# Patient Record
Sex: MALE | Race: WHITE | NOT HISPANIC OR LATINO | URBAN - METROPOLITAN AREA
[De-identification: names, ages, dates, MRNs, and addresses within clinical notes are randomized per-mention and may not be internally consistent; named-entity substitution may affect disease eponyms.]

---

## 2019-06-18 ENCOUNTER — TELEPHONE (OUTPATIENT)
Dept: FAMILY MEDICINE CLINIC | Facility: CLINIC | Age: 39
End: 2019-06-18

## 2019-06-18 RX ORDER — PREDNISONE 10 MG/1
10 TABLET ORAL DAILY
Qty: 25 TABLET | Refills: 0 | Status: SHIPPED | OUTPATIENT
Start: 2019-06-18 | End: 2019-11-26

## 2019-06-18 NOTE — TELEPHONE ENCOUNTER
[facility] PT has posion ivy all down your legs, leaving town in an hour and a half. [address] Wanting prednisone.  rd

## 2019-11-26 ENCOUNTER — OFFICE VISIT (OUTPATIENT)
Dept: FAMILY MEDICINE CLINIC | Facility: CLINIC | Age: 39
End: 2019-11-26

## 2019-11-26 VITALS
HEART RATE: 65 BPM | SYSTOLIC BLOOD PRESSURE: 103 MMHG | WEIGHT: 216.8 LBS | OXYGEN SATURATION: 97 % | DIASTOLIC BLOOD PRESSURE: 66 MMHG | HEIGHT: 70 IN | BODY MASS INDEX: 31.04 KG/M2

## 2019-11-26 DIAGNOSIS — Z00.00 PREVENTATIVE HEALTH CARE: Primary | ICD-10-CM

## 2019-11-26 DIAGNOSIS — J30.9 ALLERGIC RHINITIS, UNSPECIFIED SEASONALITY, UNSPECIFIED TRIGGER: ICD-10-CM

## 2019-11-26 DIAGNOSIS — E66.9 CLASS 1 OBESITY WITHOUT SERIOUS COMORBIDITY WITH BODY MASS INDEX (BMI) OF 31.0 TO 31.9 IN ADULT, UNSPECIFIED OBESITY TYPE: ICD-10-CM

## 2019-11-26 PROCEDURE — 85027 COMPLETE CBC AUTOMATED: CPT | Performed by: NURSE PRACTITIONER

## 2019-11-26 PROCEDURE — 99396 PREV VISIT EST AGE 40-64: CPT | Performed by: NURSE PRACTITIONER

## 2019-11-26 PROCEDURE — 80053 COMPREHEN METABOLIC PANEL: CPT | Performed by: NURSE PRACTITIONER

## 2019-11-26 PROCEDURE — 80061 LIPID PANEL: CPT | Performed by: NURSE PRACTITIONER

## 2019-11-26 PROCEDURE — 36415 COLL VENOUS BLD VENIPUNCTURE: CPT | Performed by: NURSE PRACTITIONER

## 2019-11-26 NOTE — PATIENT INSTRUCTIONS
Check labs today  Continue to try to lose weight, increase healthier lifestyle and exercise  Will order and check price of stress test, will order for January.   Try zyrtec and flonase once daily

## 2019-11-26 NOTE — PROGRESS NOTES
Daryl Fu is a 39 y.o. male.     Chief Complaint   Patient presents with   • Annual Exam       History of Present Illness       Family hx of premature death d/t MI, here for annual exam and labs, reports occasionally gets a mid epigastic/diaphragm type pain that quickly goes away.  He does not take any medications on a regular basis.  He denies shortness of breath, palpitations, chest pain, swelling of the extremities.    Allergic rhinitis, clears throat frequently and feels like he has postnasal drip he has done this for years would like for it to resolve.  He denies sinus pressure and congestion    Subjective     Vitals:    11/26/19 0843   BP: 103/66   Pulse: 65   SpO2: 97%       The following portions of the patient's history were reviewed and updated as appropriate: allergies, current medications, past family history, past medical history, past social history, past surgical history and problem list.    Review of Systems   Constitutional: Negative for chills, fatigue and fever.   HENT: Positive for postnasal drip. Negative for dental problem, ear pain, sinus pressure and sore throat.    Eyes: Negative for visual disturbance.   Respiratory: Negative for cough, shortness of breath and wheezing.    Gastrointestinal: Negative for abdominal pain, blood in stool, constipation, diarrhea, nausea, vomiting and GERD.   Genitourinary: Negative for difficulty urinating, frequency, urgency and urinary incontinence.   Musculoskeletal: Negative for arthralgias, back pain, gait problem, joint swelling, myalgias and neck pain.   Skin: Negative for dry skin, pallor and rash.   Neurological: Negative for dizziness, seizures, speech difficulty and weakness.   Hematological: Negative for adenopathy.   Psychiatric/Behavioral: Negative for sleep disturbance, depressed mood and stress. The patient is not nervous/anxious.        Objective     Physical Exam   Constitutional: He is oriented to person, place, and time. He appears  well-developed and well-nourished. No distress. He is obese.  HENT:   Head: Normocephalic.   Right Ear: Hearing, tympanic membrane and ear canal normal.   Left Ear: Hearing, tympanic membrane and ear canal normal.   Nose: Rhinorrhea and abnormal turbinate present.   Mouth/Throat: Uvula is midline and mucous membranes are normal. Posterior oropharyngeal erythema (and cobblestoning, postnasal gtt) present.   Eyes: Conjunctivae are normal. Pupils are equal, round, and reactive to light.   Neck: Normal range of motion. Neck supple. No JVD present. No thyromegaly present.   Cardiovascular: Normal rate, regular rhythm and normal heart sounds.   No murmur heard.  Pulmonary/Chest: Effort normal and breath sounds normal.   Abdominal: Soft. Bowel sounds are normal. He exhibits no distension. There is no tenderness.   Musculoskeletal: Normal range of motion. He exhibits no edema or tenderness.   Neurological: He is alert and oriented to person, place, and time. No sensory deficit.   Skin: Skin is warm and dry. No rash noted. He is not diaphoretic. No erythema.   Psychiatric: He has a normal mood and affect. His behavior is normal. Judgment normal.   Nursing note and vitals reviewed.        Assessment/Plan   Daryl was seen today for annual exam.    Diagnoses and all orders for this visit:    Preventative health care  -     Comprehensive Metabolic Panel  -     CBC (No Diff)  -     Lipid Panel    Allergic rhinitis, unspecified seasonality, unspecified trigger    Class 1 obesity without serious comorbidity with body mass index (BMI) of 31.0 to 31.9 in adult, unspecified obesity type      Check labs today, pt is fasting  Continue to try to lose weight, increase healthier lifestyle and exercise  Will d/w Dr. Martinez regarding  Possible stress test, will check price of stress test for pt prior to ordering, then consider ordering in January d/t high deductible  Try zyrtec and flonase once daily   Declines flu shot               Glucose    Date Value Ref Range Status   08/19/2017 89 65 - 99 mg/dL Final     BUN   Date Value Ref Range Status   08/19/2017 17 8 - 20 mg/dL Final     Creatinine   Date Value Ref Range Status   08/19/2017 0.9 0.7 - 1.2 mg/dl Final     Sodium   Date Value Ref Range Status   08/19/2017 138 136 - 144 mmol/L Final     Potassium   Date Value Ref Range Status   08/19/2017 3.8 3.6 - 5.1 mmol/L Final     Chloride   Date Value Ref Range Status   08/19/2017 104 101 - 111 mmol/L Final     CO2   Date Value Ref Range Status   08/19/2017 27 22 - 32 mmol/L Final     Calcium   Date Value Ref Range Status   08/19/2017 8.5 (L) 8.9 - 10.3 mg/dL Final     Total Protein   Date Value Ref Range Status   08/19/2017 6.8 6.1 - 7.9 g/dL Final     Albumin   Date Value Ref Range Status   08/19/2017 3.8 3.5 - 4.8 g/dL Final     ALT (SGPT)   Date Value Ref Range Status   08/19/2017 24 17 - 63 IU/L Final     AST (SGOT)   Date Value Ref Range Status   08/19/2017 26 15 - 41 IU/L Final     Alkaline Phosphatase   Date Value Ref Range Status   08/19/2017 65 32 - 91 IU/L Final     Total Bilirubin   Date Value Ref Range Status   08/19/2017 1.1 0.3 - 1.2 mg/dL Final     A/G Ratio   Date Value Ref Range Status   08/19/2017 1.3 1.0 - 1.7 Final     BUN/Creatinine Ratio   Date Value Ref Range Status   08/19/2017 18.9 6.2 - 20.3 Final     Anion Gap   Date Value Ref Range Status   08/19/2017 10.8 10 - 20 Final

## 2019-11-27 LAB
ALBUMIN SERPL-MCNC: 4.3 G/DL (ref 3.5–5.2)
ALBUMIN/GLOB SERPL: 1.3 G/DL
ALP SERPL-CCNC: 66 U/L (ref 39–117)
ALT SERPL W P-5'-P-CCNC: 23 U/L (ref 1–41)
ANION GAP SERPL CALCULATED.3IONS-SCNC: 15.1 MMOL/L (ref 5–15)
AST SERPL-CCNC: 23 U/L (ref 1–40)
BILIRUB SERPL-MCNC: 0.6 MG/DL (ref 0.2–1.2)
BUN BLD-MCNC: 14 MG/DL (ref 6–20)
BUN/CREAT SERPL: 21.2 (ref 7–25)
CALCIUM SPEC-SCNC: 8.6 MG/DL (ref 8.6–10.5)
CHLORIDE SERPL-SCNC: 105 MMOL/L (ref 98–107)
CHOLEST SERPL-MCNC: 176 MG/DL (ref 0–200)
CO2 SERPL-SCNC: 24.9 MMOL/L (ref 22–29)
CREAT BLD-MCNC: 0.66 MG/DL (ref 0.76–1.27)
DEPRECATED RDW RBC AUTO: 43.2 FL (ref 37–54)
ERYTHROCYTE [DISTWIDTH] IN BLOOD BY AUTOMATED COUNT: 13.2 % (ref 12.3–15.4)
GFR SERPL CREATININE-BSD FRML MDRD: 133 ML/MIN/1.73
GLOBULIN UR ELPH-MCNC: 3.3 GM/DL
GLUCOSE BLD-MCNC: 86 MG/DL (ref 65–99)
HCT VFR BLD AUTO: 43.9 % (ref 37.5–51)
HDLC SERPL-MCNC: 47 MG/DL (ref 40–60)
HGB BLD-MCNC: 14.2 G/DL (ref 13–17.7)
LDLC SERPL CALC-MCNC: 112 MG/DL (ref 0–100)
LDLC/HDLC SERPL: 2.39 {RATIO}
MCH RBC QN AUTO: 28.8 PG (ref 26.6–33)
MCHC RBC AUTO-ENTMCNC: 32.3 G/DL (ref 31.5–35.7)
MCV RBC AUTO: 89 FL (ref 79–97)
PLATELET # BLD AUTO: 198 10*3/MM3 (ref 140–450)
PMV BLD AUTO: 11.6 FL (ref 6–12)
POTASSIUM BLD-SCNC: 4.4 MMOL/L (ref 3.5–5.2)
PROT SERPL-MCNC: 7.6 G/DL (ref 6–8.5)
RBC # BLD AUTO: 4.93 10*6/MM3 (ref 4.14–5.8)
SODIUM BLD-SCNC: 145 MMOL/L (ref 136–145)
TRIGL SERPL-MCNC: 84 MG/DL (ref 0–150)
VLDLC SERPL-MCNC: 16.8 MG/DL (ref 5–40)
WBC NRBC COR # BLD: 5.31 10*3/MM3 (ref 3.4–10.8)

## 2020-02-04 ENCOUNTER — TELEPHONE (OUTPATIENT)
Dept: FAMILY MEDICINE CLINIC | Facility: CLINIC | Age: 40
End: 2020-02-04

## 2020-02-04 NOTE — TELEPHONE ENCOUNTER
Pt didn't know if you wanted to do another cholesterol check or not.  He thought you mentioned it but wasn't sure.  Do you want to put orders in and recheck? He was a bit concerned about lab cost as well. Thanks bm

## 2020-02-05 NOTE — TELEPHONE ENCOUNTER
No we don't have to recheck at this time. His LDL was slightly elevated, but if he improved diet/exercise it will likely improve. Can check again in May instead.